# Patient Record
Sex: MALE
[De-identification: names, ages, dates, MRNs, and addresses within clinical notes are randomized per-mention and may not be internally consistent; named-entity substitution may affect disease eponyms.]

---

## 2018-07-12 ENCOUNTER — HOSPITAL ENCOUNTER (EMERGENCY)
Dept: HOSPITAL 80 - FED | Age: 63
Discharge: HOME | End: 2018-07-12
Payer: SELF-PAY

## 2018-07-12 VITALS — SYSTOLIC BLOOD PRESSURE: 114 MMHG | DIASTOLIC BLOOD PRESSURE: 68 MMHG
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
## 2018-07-12 DIAGNOSIS — S01.81XA: Primary | ICD-10-CM

## 2018-07-12 DIAGNOSIS — F10.129: ICD-10-CM

## 2018-07-12 DIAGNOSIS — Z23: ICD-10-CM
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
## 2018-07-12 PROCEDURE — 0HQ1XZZ REPAIR FACE SKIN, EXTERNAL APPROACH: ICD-10-PCS | Performed by: EMERGENCY MEDICINE
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
